# Patient Record
Sex: MALE | Race: WHITE
[De-identification: names, ages, dates, MRNs, and addresses within clinical notes are randomized per-mention and may not be internally consistent; named-entity substitution may affect disease eponyms.]

---

## 2017-11-13 NOTE — MRI
EXAM:  MRI of the left ankle/hind-foot without contrast 

  

COMPARISON:  Left foot radiographs 11/06/2017. 

  

HISTORY:  Left foot/heel/ankle pain. No known injury. 

  

TECHNIQUE:  Multiplanar noncontrast MR images of the left ankle/hind-foot were acquired using a 1.2 T
esla magnet. 

  

FINDINGS:  Mild chronic-appearing deformity of the base of the fifth metatarsal as noted on previous 
radiographs which may represent sequela of a partially fused accessory ossicle or old avulsion injury
.  No marrow edema to suggest an ongoing/acute osseous injury.  Small dorsal osteophytes at the jace
avicular joint.  Inversion recovery hyperintense signal and thinning of the Lisfranc ligament suggest
ing a sprain/partial tear.  There appear to be some intact fibers on limited assessment, though corre
lation with dedicated MRI of the forefoot could be considered.  No abnormal subluxation of the tarsom
etatarsal joints.  The talar dome is intact.  Trace joint effusions of the ankle/hind-foot. 

  

Small retrocalcaneal plantar calcaneal spurs.  Minimal distal Achilles tendinosis and peritendinitis 
without a tendon tear.  Mild active inflammation of the proximal portion of the plantar fascia withou
t fascial rupture. 

  

The anterior tibial, extensor hallicus longus and extensor digitorum tendons are intact.  Tendinosis 
and partial tear of the posterior tibial tendon from the medial malleolus through the navicular tuber
osity with thinning of the tendon though intact fibers are identified.  Mild posterior tibial tenosyn
ovitis.  Flexor digitorum is intact.  Mild flexor hallicus longus tenosynovitis.  Peroneus longus/rei
vis tendinosis and tenosynovitis with partial/split tear of the peroneus brevis at/below the lateral 
malleolus. 

  

There is subcutaneous edema most pronounced medially and laterally.  Sprain of the anterior tibiofibu
lar ligament without abnormal widening syndesmosis.  Sprain/partial tear of the anterior talofibular 
ligament.  Sprains with scarring of the calcaneofibular, posterior talofibular and deltoid ligaments.
 

  

IMPRESSION: 

1.  Mild chronic-appearing deformity of the base of the fifth metatarsal related to a partially fused
 accessory ossicle or sequela of old trauma.  No acute osseous injury at that site. Mild degenerative
 changes.  Trace joint effusions. 

2.  Findings concerning for a least a sprain/partial tear of the Lisfranc ligament with suspected res
idual intact fibers.  Correlate with physical examination and consider further assessment with dedica
fermín MRI of the left forefoot. 

3.  Subcutaneous edema. 

4.  Mild Achilles tendinosis and peritendinitis with enthesopathy.  No tendon tear.  Mild acute on ch
ronic changes of plantar fasciitis. 

5.  Tendinosis/partial tear and tenosynovitis involving the posterior tibial tendon. Flexor hallicus 
longus tenosynovitis. 

Peroneus longus/brevis tendinosis with partial/split tear of the peroneus brevis. 

6.  Sprains with scarring of the medial and lateral supporting ligaments of the ankle as described.

## 2018-06-27 ENCOUNTER — HOSPITAL ENCOUNTER (OUTPATIENT)
Dept: HOSPITAL 58 - OUTPT | Age: 33
End: 2018-06-27
Attending: OTOLARYNGOLOGY

## 2018-06-27 DIAGNOSIS — H91.90: Primary | ICD-10-CM
